# Patient Record
Sex: FEMALE | Race: WHITE | Employment: FULL TIME | URBAN - NONMETROPOLITAN AREA
[De-identification: names, ages, dates, MRNs, and addresses within clinical notes are randomized per-mention and may not be internally consistent; named-entity substitution may affect disease eponyms.]

---

## 2018-07-23 ENCOUNTER — HOSPITAL ENCOUNTER (OUTPATIENT)
Dept: INFUSION THERAPY | Age: 38
Discharge: HOME | End: 2018-07-23
Payer: COMMERCIAL

## 2018-07-23 ENCOUNTER — OFFICE VISIT (OUTPATIENT)
Dept: ONCOLOGY | Age: 38
End: 2018-07-23
Payer: COMMERCIAL

## 2018-07-23 VITALS
RESPIRATION RATE: 18 BRPM | DIASTOLIC BLOOD PRESSURE: 76 MMHG | OXYGEN SATURATION: 97 % | BODY MASS INDEX: 47.09 KG/M2 | TEMPERATURE: 97.9 F | HEIGHT: 66 IN | HEART RATE: 71 BPM | WEIGHT: 293 LBS | SYSTOLIC BLOOD PRESSURE: 138 MMHG

## 2018-07-23 DIAGNOSIS — D72.829 LEUKOCYTOSIS, UNSPECIFIED TYPE: ICD-10-CM

## 2018-07-23 DIAGNOSIS — D72.829 LEUKOCYTOSIS, UNSPECIFIED TYPE: Primary | ICD-10-CM

## 2018-07-23 LAB
ALBUMIN SERPL-MCNC: 4.3 G/DL (ref 3.5–5.1)
ALP BLD-CCNC: 66 U/L (ref 38–126)
ALT SERPL-CCNC: 20 U/L (ref 11–66)
APTT: 35.5 SECONDS (ref 22–38)
AST SERPL-CCNC: 18 U/L (ref 5–40)
BASINOPHIL, AUTOMATED: 1 % (ref 0–3)
BILIRUB SERPL-MCNC: 0.3 MG/DL (ref 0.3–1.2)
BILIRUBIN DIRECT: < 0.2 MG/DL (ref 0–0.3)
EOSINOPHILS RELATIVE PERCENT: 4 % (ref 0–4)
HCT VFR BLD CALC: 40.8 % (ref 37–47)
HEMOGLOBIN: 13.5 GM/DL (ref 12–16)
INR BLD: 1 (ref 0.85–1.13)
LYMPHOCYTES # BLD: 33 % (ref 15–47)
MCH RBC QN AUTO: 29.1 PG (ref 27–31)
MCHC RBC AUTO-ENTMCNC: 33.2 GM/DL (ref 33–37)
MCV RBC AUTO: 88 FL (ref 81–99)
MONOCYTES: 7 % (ref 0–12)
PDW BLD-RTO: 12.2 % (ref 11.5–14.5)
PLATELET # BLD: 372 THOU/MM3 (ref 130–400)
PLATELET FUNCTION INTERPRETATION: NORMAL SECONDS
PLT FUNCTION, EPI: 120 SECONDS (ref 68–175)
PMV BLD AUTO: 6.9 FL (ref 7.4–10.4)
RBC # BLD: 4.64 MILL/MM3 (ref 4.2–5.4)
SEG NEUTROPHILS: 55 % (ref 43–75)
TOTAL PROTEIN: 8.5 G/DL (ref 6.1–8)
WBC # BLD: 10.9 THOU/MM3 (ref 4.8–10.8)

## 2018-07-23 PROCEDURE — 80076 HEPATIC FUNCTION PANEL: CPT

## 2018-07-23 PROCEDURE — 85730 THROMBOPLASTIN TIME PARTIAL: CPT

## 2018-07-23 PROCEDURE — 99211 OFF/OP EST MAY X REQ PHY/QHP: CPT

## 2018-07-23 PROCEDURE — 99243 OFF/OP CNSLTJ NEW/EST LOW 30: CPT | Performed by: INTERNAL MEDICINE

## 2018-07-23 PROCEDURE — 85610 PROTHROMBIN TIME: CPT

## 2018-07-23 PROCEDURE — 85576 BLOOD PLATELET AGGREGATION: CPT

## 2018-07-23 PROCEDURE — 88184 FLOWCYTOMETRY/ TC 1 MARKER: CPT

## 2018-07-23 PROCEDURE — 36415 COLL VENOUS BLD VENIPUNCTURE: CPT

## 2018-07-23 PROCEDURE — G0463 HOSPITAL OUTPT CLINIC VISIT: HCPCS

## 2018-07-23 PROCEDURE — 85025 COMPLETE CBC W/AUTO DIFF WBC: CPT

## 2018-07-23 PROCEDURE — 88185 FLOWCYTOMETRY/TC ADD-ON: CPT

## 2018-07-23 RX ORDER — PROPRANOLOL HYDROCHLORIDE 60 MG/1
TABLET ORAL
COMMUNITY
Start: 2018-06-11

## 2018-07-23 RX ORDER — LEVOTHYROXINE SODIUM 0.07 MG/1
TABLET ORAL
COMMUNITY
Start: 2018-06-25 | End: 2018-09-10 | Stop reason: DRUGHIGH

## 2018-07-26 LAB — LEUK/LYMPH PHENOTYPING WB: NORMAL

## 2018-08-03 NOTE — PROGRESS NOTES
[facility] CENTER  CANCER NETWORK OF [facility]  [facility] [address] [facility]  [address]  [address]  Dept: [phone]  Dept Fax: [phone]: [phone]    Dear Igor,    Thank you for your referral of this patient for evaluation of leukocytosis. I appreciate your  trust of my care for your patients. I know that you know this patient's history well, but I will summarize for my records. In addition, my recommendations and plan of care are summarized below. Please call if you have any questions or if I can be of any further assistance to you or this patient. Marc Mejia:      Chief Complaint:  Guillermina Chaudhary is a 43 y.o. with leukocytosis. HPI  This is the first visit to the [facility] for this patient who was referred by Juliet Jimenez CNP for evaluation of leukocytosis. The patient has a history of diabetes, asthma and thyroid disease. She has been noted to have a leukocytosis. Her leukocytosis has been mild. Laboratory studies and September 2012 where initially found to have a mild elevation of her total white blood cell count. Her elevated white blood cell count has been elevated in the duration of time since 2012. More recently, her total white blood cell count has been noted to be 11.5 in June 2018. Context of this condition, the patient's hemoglobin and platelet count have been noted to be normal.  In addition, her white blood cell differential is essentially unremarkable. The patient has no signs or symptoms specifically related to her elevated white blood cell count. She has not had any evidence of chronic infection. The patient is not on any medications that of the known to chronically elevated total white blood cell count.   She does have complaints of fatigue, night sweats, itching, shortness of breath, diarrhea, joint pain, headaches, dizziness, depression, and anxiety. Her overall activity level is fairly stable. The patient denies shortness of breath, chest pain, a change in bowel habits or a change in bladder habits. ECOG performance status is level 0. Past Medical History  She has a past medical history of Asthma; Bleeds easily ([address] Narcolepsy; and Type 2 diabetes mellitus without complication ([address] Surgical History   She has a past surgical history that includes Foot surgery (Left, 02/2014). Home Medications  She has a current medication list which includes the following prescription(s): metformin, propranolol, levothyroxine, sumatriptan, hydrocodone-acetaminophen, armodafinil, topiramate, gabapentin, and omeprazole. Allergies  Allergies   Allergen Reactions    Latex      Social History  She reports that she has quit smoking. She smoked 1.50 packs per day. She has never used smokeless tobacco. She reports that she drinks alcohol. She reports that she does not use drugs. Family History  Her family history includes Asthma in her mother; Depression in her sister; Diabetes in her mother; Heart Disease in her mother; High Blood Pressure in her mother; Kidney Disease in her sister; Mental Illness in her mother; No Known Problems in her father; Sleep Apnea in her sister; Thyroid Disease in her mother. Review of Systems  Constitutional: Fatigue, night sweats. HENT: Negative. Eyes: Negative. Respiratory: Dyspnea. Cardiovascular: Negative. Gastrointestinal: Diarrhea. Genitourinary: Negative. Musculoskeletal: Joint pain. Skin: Itching. Neurological: Headaches, dizziness. Hematological: Negative. Psychiatric/Behavioral: Depression,anxiety. Objective:   Physical Exam  Vitals:    07/23/18 1148   BP: 138/76   Pulse: 71   Resp: 18   Temp: 97.9 °F (36.6 °C)   SpO2: 97%   Vitals reviewed and are stable. Constitutional: Well-developed and well-nourished. No acute distress. HENT: Normocephalic and atraumatic.    Eyes: Pupils are equal and reactive. No scleral icterus. Neck: Overall appearance is symmetrical. No identifiable masses. Chest: Inspection and palpation of chest is normal.  Pulmonary: Effort normal. No respiratory distress. Cardiovascular: RRR. No edema in any of the four extremities. Abdominal: Soft. No hepatomegaly or splenomegaly. Musculoskeletal: Gait is normal. Muscle strength and tone good. Neurological: Alert and oriented to person, place, and time. Judgment and thought content normal.  Skin: Skin is warm and dry. No rash. Psychiatric: Mood and affect appropriate for the clinical situation. Behavior is normal.        Data Analysis:    Hematology 7/23/2018 9/19/2012   WBC 10.9 (H) 12.3 (H)   RBC 4.64 4.40   HGB 13.5 13.1   HCT 40.8 38.6   MCV 88 87.8   RDW 12.2 12.9    363     Assessment:   1. Chronic leukocytosis. Recommendations:   I had a long discussion with the patient today regarding her elevated total white blood cell count. We reviewed her laboratory studies dating back to September 2012. She has recently been noted to have a mild elevation of her total white blood cell count but no other acute to chronic hematology findings. The patient has no specific symptoms related to her elevated total white blood cell count. I reviewed with the patient the potential etiologies that could be related to leukocytosis did indicate to her that there was no specific findings on her laboratory studies that would suggest a specific etiology. Given the chronicity of her condition with no significant overall change in her elevated white blood cell count, I have recommended a continued pattern of observation. If she would have developing acute or chronic hematological changes than a bone marrow biopsy would be our next step of evaluation. Multiple questions were answered throughout the course of the consultation. She is agreeable to the plan of care of surveillance.   She will return to the

## 2018-09-10 ENCOUNTER — HOSPITAL ENCOUNTER (OUTPATIENT)
Dept: INFUSION THERAPY | Age: 38
Discharge: HOME | End: 2018-09-10
Payer: COMMERCIAL

## 2018-09-10 ENCOUNTER — OFFICE VISIT (OUTPATIENT)
Dept: ONCOLOGY | Age: 38
End: 2018-09-10
Payer: COMMERCIAL

## 2018-09-10 VITALS
HEART RATE: 90 BPM | DIASTOLIC BLOOD PRESSURE: 84 MMHG | BODY MASS INDEX: 47.09 KG/M2 | WEIGHT: 293 LBS | OXYGEN SATURATION: 95 % | HEIGHT: 66 IN | TEMPERATURE: 98.2 F | RESPIRATION RATE: 18 BRPM | SYSTOLIC BLOOD PRESSURE: 136 MMHG

## 2018-09-10 DIAGNOSIS — D72.829 LEUKOCYTOSIS, UNSPECIFIED TYPE: Primary | ICD-10-CM

## 2018-09-10 DIAGNOSIS — D72.829 LEUKOCYTOSIS, UNSPECIFIED TYPE: ICD-10-CM

## 2018-09-10 LAB
BASOPHILS # BLD: 0.5 %
BASOPHILS ABSOLUTE: 0.1 THOU/MM3 (ref 0–0.1)
EOSINOPHIL # BLD: 2.8 %
EOSINOPHILS ABSOLUTE: 0.3 THOU/MM3 (ref 0–0.4)
HCT VFR BLD CALC: 40.6 % (ref 37–47)
HEMOGLOBIN: 13.5 GM/DL (ref 12–16)
IMMATURE GRANS (ABS): 0.14 THOU/MM3 (ref 0–0.07)
IMMATURE GRANULOCYTES: 1.2 %
LYMPHOCYTES # BLD: 33.6 %
LYMPHOCYTES ABSOLUTE: 3.9 THOU/MM3 (ref 1–4.8)
MCH RBC QN AUTO: 29.8 PG (ref 27–31)
MCHC RBC AUTO-ENTMCNC: 33.3 GM/DL (ref 33–37)
MCV RBC AUTO: 89 FL (ref 81–99)
MONOCYTES # BLD: 8.6 %
MONOCYTES ABSOLUTE: 1 THOU/MM3 (ref 0.4–1.3)
NUCLEATED RED BLOOD CELLS: 0 /100 WBC
PDW BLD-RTO: 11.8 % (ref 11.5–14.5)
PLATELET # BLD: 439 THOU/MM3 (ref 130–400)
PMV BLD AUTO: 7.3 FL (ref 7.4–10.4)
RBC # BLD: 4.54 MILL/MM3 (ref 4.2–5.4)
SEG NEUTROPHILS: 53.3 %
SEGMENTED NEUTROPHILS ABSOLUTE COUNT: 6.1 THOU/MM3 (ref 1.8–7.7)
WBC # BLD: 11.5 THOU/MM3 (ref 4.8–10.8)

## 2018-09-10 PROCEDURE — 36415 COLL VENOUS BLD VENIPUNCTURE: CPT

## 2018-09-10 PROCEDURE — 99211 OFF/OP EST MAY X REQ PHY/QHP: CPT

## 2018-09-10 PROCEDURE — 85025 COMPLETE CBC W/AUTO DIFF WBC: CPT

## 2018-09-10 PROCEDURE — 99213 OFFICE O/P EST LOW 20 MIN: CPT | Performed by: INTERNAL MEDICINE

## 2018-09-10 RX ORDER — LEVOTHYROXINE SODIUM 0.1 MG/1
TABLET ORAL
COMMUNITY
Start: 2018-08-27 | End: 2019-03-11 | Stop reason: DRUGHIGH

## 2018-09-15 NOTE — PROGRESS NOTES
[facility] CENTER  CANCER NETWORK OF [facility]  [facility] [address] [facility]  [address]  [address]  Dept: [phone]  Dept Fax: [phone]  Loc: [phone]    Subjective:      Chief Complaint:  Jose A Whyte is a 43 y.o. with leukocytosis. The patient has a history of diabetes, asthma and thyroid disease. She has been noted to have a leukocytosis. Her leukocytosis has been mild. Laboratory studies and September 2012 where initially found to have a mild elevation of her total white blood cell count. Her elevated white blood cell count has been elevated in the duration of time since 2012. HPI  The patient is here today for follow up evaluation. She has been noted to have a mild chronic elevation of her total white blood cell count. This has been present since 2012. She remains asymptomatic related to her elevated white blood cell count. The patient denies having frequent or severe infections. She does continue to complain of fatigue which is not likely related to her laboratory studies. Flow cytometry immunophenotyping analysis was completed on peripheral blood on July 23, 2018. This found no evidence of monoclonal white blood cells and no evidence of leukemia or a lymphoproliferative disorder. The results reviewed with the patient today. On today's laboratory studies the patient's total white blood cell count remains mildly elevated. She also has a mild elevation of her platelet count. Both of these are likely reactive in nature. I recommended a continued pattern of surveillance. The patient denies shortness of breath, chest pain, a change in bowel habits or a change in bladder habits. ECOG performance status is level 0. PMH, SH, and FH:  I reviewed the patients medication list and allergy list as noted on the electronic medical record.  The PMH, SH and FH were also reviewed as noted on the

## 2019-03-11 ENCOUNTER — OFFICE VISIT (OUTPATIENT)
Dept: ONCOLOGY | Age: 39
End: 2019-03-11
Payer: COMMERCIAL

## 2019-03-11 ENCOUNTER — HOSPITAL ENCOUNTER (OUTPATIENT)
Dept: INFUSION THERAPY | Age: 39
Discharge: HOME | End: 2019-03-11
Payer: COMMERCIAL

## 2019-03-11 VITALS
OXYGEN SATURATION: 95 % | RESPIRATION RATE: 18 BRPM | HEIGHT: 66 IN | HEART RATE: 89 BPM | BODY MASS INDEX: 47.09 KG/M2 | TEMPERATURE: 98.9 F | DIASTOLIC BLOOD PRESSURE: 87 MMHG | WEIGHT: 293 LBS | SYSTOLIC BLOOD PRESSURE: 137 MMHG

## 2019-03-11 DIAGNOSIS — D72.829 LEUKOCYTOSIS, UNSPECIFIED TYPE: ICD-10-CM

## 2019-03-11 DIAGNOSIS — D72.829 LEUKOCYTOSIS, UNSPECIFIED TYPE: Primary | ICD-10-CM

## 2019-03-11 DIAGNOSIS — D75.839 THROMBOCYTOSIS: ICD-10-CM

## 2019-03-11 LAB
BASOPHILS # BLD: 0.6 %
BASOPHILS ABSOLUTE: 0.1 THOU/MM3 (ref 0–0.1)
EOSINOPHIL # BLD: 3 %
EOSINOPHILS ABSOLUTE: 0.4 THOU/MM3 (ref 0–0.4)
HCT VFR BLD CALC: 41.2 % (ref 37–47)
HEMOGLOBIN: 13.5 GM/DL (ref 12–16)
IMMATURE GRANS (ABS): 0.05 THOU/MM3 (ref 0–0.07)
IMMATURE GRANULOCYTES: 0.4 %
LYMPHOCYTES # BLD: 40.7 %
LYMPHOCYTES ABSOLUTE: 4.9 THOU/MM3 (ref 1–4.8)
MCH RBC QN AUTO: 28.8 PG (ref 27–31)
MCHC RBC AUTO-ENTMCNC: 32.7 GM/DL (ref 33–37)
MCV RBC AUTO: 88 FL (ref 81–99)
MONOCYTES # BLD: 6.7 %
MONOCYTES ABSOLUTE: 0.8 THOU/MM3 (ref 0.4–1.3)
NUCLEATED RED BLOOD CELLS: 0 /100 WBC
PDW BLD-RTO: 12 % (ref 11.5–14.5)
PLATELET # BLD: 407 THOU/MM3 (ref 130–400)
PMV BLD AUTO: 7 FL (ref 7.4–10.4)
RBC # BLD: 4.68 MILL/MM3 (ref 4.2–5.4)
SEG NEUTROPHILS: 48.6 %
SEGMENTED NEUTROPHILS ABSOLUTE COUNT: 5.9 THOU/MM3 (ref 1.8–7.7)
WBC # BLD: 12.1 THOU/MM3 (ref 4.8–10.8)

## 2019-03-11 PROCEDURE — 99213 OFFICE O/P EST LOW 20 MIN: CPT | Performed by: INTERNAL MEDICINE

## 2019-03-11 PROCEDURE — 36415 COLL VENOUS BLD VENIPUNCTURE: CPT

## 2019-03-11 PROCEDURE — 99211 OFF/OP EST MAY X REQ PHY/QHP: CPT

## 2019-03-11 PROCEDURE — 85025 COMPLETE CBC W/AUTO DIFF WBC: CPT

## 2019-03-11 RX ORDER — MEDROXYPROGESTERONE ACETATE 150 MG/ML
150 INJECTION, SUSPENSION INTRAMUSCULAR
COMMUNITY

## 2023-08-21 LAB — URINE CULTURE, ROUTINE: ABNORMAL
